# Patient Record
Sex: FEMALE | Race: BLACK OR AFRICAN AMERICAN | Employment: UNEMPLOYED | URBAN - METROPOLITAN AREA
[De-identification: names, ages, dates, MRNs, and addresses within clinical notes are randomized per-mention and may not be internally consistent; named-entity substitution may affect disease eponyms.]

---

## 2022-09-14 ENCOUNTER — TRANSCRIBE ORDER (OUTPATIENT)
Dept: SCHEDULING | Age: 62
End: 2022-09-14

## 2022-09-14 DIAGNOSIS — N63.42 SUBAREOLAR MASS OF LEFT BREAST: Primary | ICD-10-CM

## 2022-09-20 ENCOUNTER — HOSPITAL ENCOUNTER (OUTPATIENT)
Dept: ULTRASOUND IMAGING | Age: 62
Discharge: HOME | End: 2022-09-20
Attending: NURSE PRACTITIONER
Payer: MEDICAID

## 2022-09-20 ENCOUNTER — HOSPITAL ENCOUNTER (OUTPATIENT)
Dept: MAMMOGRAPHY | Age: 62
Discharge: HOME | End: 2022-09-20
Attending: NURSE PRACTITIONER
Payer: MEDICAID

## 2022-09-20 DIAGNOSIS — N63.42 SUBAREOLAR MASS OF LEFT BREAST: ICD-10-CM

## 2022-09-20 PROCEDURE — 76642 ULTRASOUND BREAST LIMITED: CPT

## 2022-09-20 PROCEDURE — 77062 BREAST TOMOSYNTHESIS BI: CPT

## 2023-03-15 ENCOUNTER — TRANSCRIBE ORDER (OUTPATIENT)
Dept: SCHEDULING | Age: 63
End: 2023-03-15

## 2023-03-15 DIAGNOSIS — N63.20 MASS OF LEFT BREAST: Primary | ICD-10-CM

## 2023-04-21 DIAGNOSIS — N63.42 SUBAREOLAR MASS OF LEFT BREAST: Primary | ICD-10-CM

## 2025-03-09 ENCOUNTER — HOSPITAL ENCOUNTER (EMERGENCY)
Facility: HOSPITAL | Age: 65
Discharge: HOME | End: 2025-03-09
Attending: EMERGENCY MEDICINE
Payer: MEDICARE

## 2025-03-09 ENCOUNTER — APPOINTMENT (OUTPATIENT)
Facility: HOSPITAL | Age: 65
End: 2025-03-09
Payer: MEDICARE

## 2025-03-09 VITALS
SYSTOLIC BLOOD PRESSURE: 194 MMHG | OXYGEN SATURATION: 95 % | HEART RATE: 74 BPM | DIASTOLIC BLOOD PRESSURE: 131 MMHG | HEIGHT: 65 IN | TEMPERATURE: 97.4 F | WEIGHT: 141.9 LBS | RESPIRATION RATE: 19 BRPM | BODY MASS INDEX: 23.64 KG/M2

## 2025-03-09 DIAGNOSIS — R51.9 ACUTE NONINTRACTABLE HEADACHE, UNSPECIFIED HEADACHE TYPE: Primary | ICD-10-CM

## 2025-03-09 DIAGNOSIS — I10 PRIMARY HYPERTENSION: ICD-10-CM

## 2025-03-09 PROCEDURE — 6360000002 HC RX W HCPCS: Performed by: EMERGENCY MEDICINE

## 2025-03-09 PROCEDURE — 6370000000 HC RX 637 (ALT 250 FOR IP): Performed by: EMERGENCY MEDICINE

## 2025-03-09 PROCEDURE — 99284 EMERGENCY DEPT VISIT MOD MDM: CPT

## 2025-03-09 PROCEDURE — 70450 CT HEAD/BRAIN W/O DYE: CPT

## 2025-03-09 PROCEDURE — 96372 THER/PROPH/DIAG INJ SC/IM: CPT

## 2025-03-09 RX ORDER — LISINOPRIL 10 MG/1
10 TABLET ORAL DAILY
Qty: 90 TABLET | Refills: 0 | Status: SHIPPED | OUTPATIENT
Start: 2025-03-09

## 2025-03-09 RX ORDER — ACETAMINOPHEN 500 MG
1000 TABLET ORAL
Status: COMPLETED | OUTPATIENT
Start: 2025-03-09 | End: 2025-03-09

## 2025-03-09 RX ORDER — KETOROLAC TROMETHAMINE 15 MG/ML
15 INJECTION, SOLUTION INTRAMUSCULAR; INTRAVENOUS ONCE
Status: COMPLETED | OUTPATIENT
Start: 2025-03-09 | End: 2025-03-09

## 2025-03-09 RX ORDER — LISINOPRIL 10 MG/1
10 TABLET ORAL
Status: COMPLETED | OUTPATIENT
Start: 2025-03-09 | End: 2025-03-09

## 2025-03-09 RX ADMIN — KETOROLAC TROMETHAMINE 15 MG: 15 INJECTION, SOLUTION INTRAMUSCULAR; INTRAVENOUS at 18:07

## 2025-03-09 RX ADMIN — ACETAMINOPHEN 1000 MG: 500 TABLET ORAL at 18:07

## 2025-03-09 RX ADMIN — LISINOPRIL 10 MG: 10 TABLET ORAL at 18:07

## 2025-03-09 ASSESSMENT — PAIN DESCRIPTION - DESCRIPTORS: DESCRIPTORS: ACHING

## 2025-03-09 ASSESSMENT — LIFESTYLE VARIABLES
HOW OFTEN DO YOU HAVE A DRINK CONTAINING ALCOHOL: NEVER
HOW MANY STANDARD DRINKS CONTAINING ALCOHOL DO YOU HAVE ON A TYPICAL DAY: PATIENT DOES NOT DRINK

## 2025-03-09 ASSESSMENT — PAIN DESCRIPTION - LOCATION: LOCATION: HEAD

## 2025-03-09 ASSESSMENT — PAIN - FUNCTIONAL ASSESSMENT: PAIN_FUNCTIONAL_ASSESSMENT: ACTIVITIES ARE NOT PREVENTED

## 2025-03-09 ASSESSMENT — PAIN SCALES - GENERAL: PAINLEVEL_OUTOF10: 10

## 2025-03-09 NOTE — ED PROVIDER NOTES
of Health:   Social Drivers of Health     Tobacco Use: High Risk (3/9/2025)    Patient History     Smoking Tobacco Use: Every Day     Smokeless Tobacco Use: Unknown     Passive Exposure: Never   Alcohol Use: Not At Risk (3/9/2025)    AUDIT-C     Frequency of Alcohol Consumption: Never     Average Number of Drinks: Patient does not drink     Frequency of Binge Drinking: Never   Financial Resource Strain: Not on file   Food Insecurity: Not on file   Transportation Needs: Not on file   Physical Activity: Not on file   Stress: Not on file   Social Connections: Not on file   Intimate Partner Violence: Not on file   Depression: Not on file   Housing Stability: Not on file   Interpersonal Safety: Not At Risk (3/9/2025)    Interpersonal Safety Domain Source: IP Abuse Screening     Physical abuse: Denies     Verbal abuse: Denies     Emotional abuse: Denies     Financial abuse: Denies     Sexual abuse: Denies   Utilities: Not on file       PHYSICAL EXAM   Constitutional: Awake and alert, interactive, NAD  Eyes: PERRL, no injection or scleral icterus, no discharge  HEENT: NCAT, neck supple, MMM, no oropharyngeal exudates  CV: RRR, no m/r/g  Respiratory: CTAB, no r/r/w  GI: Abd soft, nondistended, nontender  : Deferred  MSK: FROM, no joint effusions or edema  Skin: No rashes  Neuro: CN2-12 intact, 5/5 strength throughout. SILT distally. No dysmetria, dysdiadochokinesia. No pronator drift. Normal gait.  Psych: Well-groomed, normal speech, behavior, appropriate mood  Lymph: No LAD        SCREENINGS                   LAB, EKG AND DIAGNOSTIC RESULTS   Labs:  No results found for this or any previous visit (from the past 12 hours).      EKG interpretation by me:       Radiologic Studies:  Non-plain film images such as CT, Ultrasound and MRI are read by the radiologist. Plain radiographic images are visualized and preliminarily interpreted by the ED Provider with the following findings: (SEE ED COURSE)    Interpretation per the

## 2025-03-09 NOTE — DISCHARGE INSTRUCTIONS
unremarkable. No extra-axial collections are seen. Mastoid air cells are well pneumatized and clear.  Minimal chronic microvascular change.     No acute intracranial process. There is no intracranial mass or hemorrhage. Electronically signed by CARTER GRACE    ------------------------------------------------------------------------------------------------------------  The evaluation and treatment you received in the Emergency Department were for an urgent problem. It is important that you follow-up with a doctor, nurse practitioner, or physician assistant to:  (1) confirm your diagnosis,  (2) re-evaluation of changes in your illness and treatment, and (3) for ongoing care. Please take your discharge instructions with you when you go to your follow-up appointment.     If you have any problem arranging a follow-up appointment, contact us!  If your symptoms become worse or you do not improve as expected, please return to us. We are available 24 hours a day.     If a prescription has been provided, please fill it as soon as possible to prevent a delay in treatment. If you have any questions or reservations about taking the medication due to side effects or interactions with other medications, please call your primary care provider or contact us directly.  Again, THANK YOU for choosing us to care for YOU!

## 2025-03-09 NOTE — ED NOTES
Patient verbalizing care coordination issues including not being able to get a ride to her doctor's office.  Patient has been out of her medications for at least 3 weeks.  Patient also states she was told she had a breast mass but did not follow up because she found the mammogram to be painful.

## 2025-04-02 ENCOUNTER — APPOINTMENT (OUTPATIENT)
Facility: HOSPITAL | Age: 65
End: 2025-04-02
Payer: MEDICARE

## 2025-04-02 ENCOUNTER — HOSPITAL ENCOUNTER (EMERGENCY)
Facility: HOSPITAL | Age: 65
Discharge: HOME | End: 2025-04-02
Attending: EMERGENCY MEDICINE
Payer: MEDICARE

## 2025-04-02 VITALS
SYSTOLIC BLOOD PRESSURE: 161 MMHG | RESPIRATION RATE: 18 BRPM | HEART RATE: 88 BPM | DIASTOLIC BLOOD PRESSURE: 124 MMHG | TEMPERATURE: 98.9 F | WEIGHT: 150 LBS | HEIGHT: 65 IN | OXYGEN SATURATION: 96 % | BODY MASS INDEX: 24.99 KG/M2

## 2025-04-02 DIAGNOSIS — R07.89 CHEST WALL PAIN: Primary | ICD-10-CM

## 2025-04-02 PROCEDURE — 6360000002 HC RX W HCPCS: Performed by: EMERGENCY MEDICINE

## 2025-04-02 PROCEDURE — 96374 THER/PROPH/DIAG INJ IV PUSH: CPT

## 2025-04-02 PROCEDURE — 71110 X-RAY EXAM RIBS BIL 3 VIEWS: CPT

## 2025-04-02 PROCEDURE — 99284 EMERGENCY DEPT VISIT MOD MDM: CPT

## 2025-04-02 PROCEDURE — 96375 TX/PRO/DX INJ NEW DRUG ADDON: CPT

## 2025-04-02 RX ORDER — KETOROLAC TROMETHAMINE 15 MG/ML
15 INJECTION, SOLUTION INTRAMUSCULAR; INTRAVENOUS ONCE
Status: DISCONTINUED | OUTPATIENT
Start: 2025-04-02 | End: 2025-04-02

## 2025-04-02 RX ORDER — KETOROLAC TROMETHAMINE 15 MG/ML
15 INJECTION, SOLUTION INTRAMUSCULAR; INTRAVENOUS
Status: COMPLETED | OUTPATIENT
Start: 2025-04-02 | End: 2025-04-02

## 2025-04-02 RX ORDER — MORPHINE SULFATE 4 MG/ML
4 INJECTION, SOLUTION INTRAMUSCULAR; INTRAVENOUS
Status: COMPLETED | OUTPATIENT
Start: 2025-04-02 | End: 2025-04-02

## 2025-04-02 RX ORDER — HYDROCODONE BITARTRATE AND ACETAMINOPHEN 5; 325 MG/1; MG/1
1 TABLET ORAL EVERY 6 HOURS PRN
Qty: 12 TABLET | Refills: 0 | Status: SHIPPED | OUTPATIENT
Start: 2025-04-02 | End: 2025-04-05

## 2025-04-02 RX ORDER — MORPHINE SULFATE 4 MG/ML
4 INJECTION, SOLUTION INTRAMUSCULAR; INTRAVENOUS
Status: DISCONTINUED | OUTPATIENT
Start: 2025-04-02 | End: 2025-04-02

## 2025-04-02 RX ADMIN — MORPHINE SULFATE 4 MG: 4 INJECTION, SOLUTION INTRAMUSCULAR; INTRAVENOUS at 01:54

## 2025-04-02 RX ADMIN — KETOROLAC TROMETHAMINE 15 MG: 15 INJECTION, SOLUTION INTRAMUSCULAR; INTRAVENOUS at 01:54

## 2025-04-02 ASSESSMENT — LIFESTYLE VARIABLES
HOW MANY STANDARD DRINKS CONTAINING ALCOHOL DO YOU HAVE ON A TYPICAL DAY: PATIENT DOES NOT DRINK
HOW OFTEN DO YOU HAVE A DRINK CONTAINING ALCOHOL: NEVER

## 2025-04-02 ASSESSMENT — PAIN - FUNCTIONAL ASSESSMENT: PAIN_FUNCTIONAL_ASSESSMENT: 0-10

## 2025-04-02 ASSESSMENT — PAIN SCALES - GENERAL: PAINLEVEL_OUTOF10: 10

## 2025-04-02 ASSESSMENT — ENCOUNTER SYMPTOMS: GASTROINTESTINAL NEGATIVE: 1
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
## 2025-04-03 ASSESSMENT — ENCOUNTER SYMPTOMS: RESPIRATORY NEGATIVE: 1
